# Patient Record
Sex: MALE | Race: WHITE | URBAN - METROPOLITAN AREA
[De-identification: names, ages, dates, MRNs, and addresses within clinical notes are randomized per-mention and may not be internally consistent; named-entity substitution may affect disease eponyms.]

---

## 2017-02-08 ENCOUNTER — HOSPITAL ENCOUNTER (EMERGENCY)
Facility: CLINIC | Age: 22
Discharge: HOME | End: 2017-02-09
Attending: EMERGENCY MEDICINE | Admitting: EMERGENCY MEDICINE

## 2017-02-08 DIAGNOSIS — T78.40XA ALLERGIC REACTION, INITIAL ENCOUNTER: ICD-10-CM

## 2017-02-08 PROCEDURE — 99283 EMERGENCY DEPT VISIT LOW MDM: CPT | Performed by: EMERGENCY MEDICINE

## 2017-02-08 PROCEDURE — 99284 EMERGENCY DEPT VISIT MOD MDM: CPT | Mod: Z6 | Performed by: EMERGENCY MEDICINE

## 2017-02-08 RX ORDER — FLUTICASONE PROPIONATE 50 MCG
1 SPRAY, SUSPENSION (ML) NASAL DAILY
COMMUNITY

## 2017-02-08 NOTE — ED AVS SNAPSHOT
[facility], [place], Emergency Department    [address]    [place] [address]    Phone:  [phone]    Fax:  [phone]                                       Yordan Anguiano   MRN: [id number]    Department:  [facility], Emergency Department   Date of Visit:  2/8/2017           After Visit Summary Signature Page     I have received my discharge instructions, and my questions have been answered. I have discussed any challenges I see with this plan with the nurse or doctor.    ..........................................................................................................................................  Patient/Patient Representative Signature      ..........................................................................................................................................  Patient Representative Print Name and Relationship to Patient    ..................................................               ................................................  Date                                            Time    ..........................................................................................................................................  Reviewed by Signature/Title    ...................................................              ..............................................  Date                                                            Time

## 2017-02-08 NOTE — ED AVS SNAPSHOT
" [facility], Emergency Department    [address]    [place] [address]    Phone:  [phone]    Fax:  [phone]                                       Yordan Anguiano   MRN: [id number]    Department:  [facility], Emergency Department   Date of Visit:  2/8/2017           Patient Information     Date Of Birth          1995        Your diagnoses for this visit were:     Allergic reaction, initial encounter        You were seen by Abelardo Sanchez MD.      Follow-up Information     Follow up with Darius Marino MD.    Specialty:  Family Practice    Contact information:    [facility] [place]  [address]  [place] [address]  [phone]          Discharge Instructions         Allergic Reaction,Generalized [Other]  You are having an allergic reaction. This may cause an itchy rash, dizziness, fainting, trouble breathing or swallowing, and swelling of the face or other parts of the body.  This can be caused by exposure to something in your surroundings that you have become sensitive to. This could be due to medicine or food. This could also be due to something you put on your skin or in your hair or something in the air. Often it is not possible to find out exactly what has caused your reaction.  The goal of today's treatment is to relieve symptoms. The rash will usually fade over several days, but can sometimes last up to two weeks.  Home Care:  1) If you know what you are allergic to, avoid it because future reactions could be worse than this one.  2) Avoid tight clothing and anything that heats up your skin (hot showers/baths, direct sunlight) since heat will make itching worse.  3) An ice pack will relieve local areas of intense itching and redness. Lanacaine cream or Solarcaine spray (or other product containing \"benzocaine\", available without a prescription) will reduce the itching.  4) Oral Benadryl (diphenhydramine) is an antihistamine available at drug and grocery stores. " Unless a prescription antihistamine was given, Benadryl may be used to reduce itching if large areas of the skin are involved. Use lower doses during the daytime and higher doses at bedtime since the drug may make you sleepy. [NOTE: Do not use Benadryl if you have glaucoma or if you are a man with trouble urinating due to an enlarged prostate.] Claritin (loratidine) is an antihistamine that causes less drowsiness and is a good alternative for daytime use.  Follow Up  Follow Up with your doctor or this facility in two days if your symptoms do not continue to improve. If you had a severe reaction today, or if you have had several mild-moderate allergic reactions in the past, ask your doctor about allergy testing to find out what you are allergic to. If your reaction included dizziness, fainting or trouble breathing or swallowing, ask your doctor about carrying an Allergy Kit (injectable epinephrine) for home use.  Get Prompt Medical Attention if any of the following occur:  -- Trouble breathing or swallowing  -- New or worse swelling in the face, eyelids, lips, mouth, tongue or throat  -- Dizziness, weakness or fainting    2041-7349 [employer]. [address] [place] [address]. All rights reserved. This information is not intended as a substitute for professional medical care. Always follow your healthcare professional's instructions.      Take prednisone as directed and an antihistamine as needed. Many antihistamines available including Benadryl (diphenhydramine) which can be sedating and Claritin (loratadine) which is not sedating.  Return if persistent symptoms, especially if swelling or trouble breathing.  Follow up also with your primary care provider.    24 Hour Appointment Hotline       To make an appointment at any [facility], call [phone] ([phone]). If you don't have a family doctor or clinic, we will help you find one. [facility] are conveniently located to  "serve the needs of you and your family.             Review of your medicines      START taking        Dose / Directions Last dose taken    predniSONE 20 MG tablet   Commonly known as:  DELTASONE   Quantity:  10 tablet        Take two tablets (= 40mg) each day for 5 (five) days   Refills:  0          Our records show that you are taking the medicines listed below. If these are incorrect, please call your family doctor or clinic.        Dose / Directions Last dose taken    fluticasone 50 MCG/ACT spray   Commonly known as:  FLONASE   Dose:  1 spray        Spray 1 spray into both nostrils daily   Refills:  0                Prescriptions were sent or printed at these locations (1 Prescription)                   Other Prescriptions                Printed at Department/Unit printer (1 of 1)         predniSONE (DELTASONE) 20 MG tablet                Orders Needing Specimen Collection     None      Pending Results     No orders found for last 2 day(s).            Pending Culture Results     No orders found for last 2 day(s).            Thank you for choosing [facility]       Thank you for choosing [facility] for your care. Our goal is always to provide you with excellent care. Hearing back from our patients is one way we can continue to improve our services. Please take a few minutes to complete the written survey that you may receive in the mail after you visit with us. Thank you!        [employer] Information     [employer] lets you send messages to your doctor, view your test results, renew your prescriptions, schedule appointments and more. To sign up, go to [email or web address] . Click on \"Log in\" on the left side of the screen, which will take you to the Welcome page. Then click on \"Sign up Now\" on the right side of the page.     You will be asked to enter the access code listed below, as well as some personal information. Please follow the directions to create your username and password.     Your access code is: " [email or web address]  Expires: 5/10/2017 12:33 AM     Your access code will  in 90 days. If you need help or a new code, please call your [facility] or [phone].        Care EveryWhere ID     This is your Care EveryWhere ID. This could be used by other organizations to access your [facility] medical records  [id number]        After Visit Summary       This is your record. Keep this with you and show to your community pharmacist(s) and doctor(s) at your next visit.

## 2017-02-09 VITALS
OXYGEN SATURATION: 99 % | DIASTOLIC BLOOD PRESSURE: 88 MMHG | SYSTOLIC BLOOD PRESSURE: 142 MMHG | RESPIRATION RATE: 18 BRPM | WEIGHT: 227 LBS | TEMPERATURE: 98.1 F

## 2017-02-09 PROCEDURE — 25000132 ZZH RX MED GY IP 250 OP 250 PS 637: Performed by: EMERGENCY MEDICINE

## 2017-02-09 PROCEDURE — 25000125 ZZHC RX 250: Performed by: EMERGENCY MEDICINE

## 2017-02-09 RX ORDER — PREDNISONE 20 MG/1
40 TABLET ORAL ONCE
Status: COMPLETED | OUTPATIENT
Start: 2017-02-09 | End: 2017-02-09

## 2017-02-09 RX ORDER — PREDNISONE 20 MG/1
TABLET ORAL
Qty: 10 TABLET | Refills: 0 | Status: SHIPPED | OUTPATIENT
Start: 2017-02-09

## 2017-02-09 RX ORDER — DIPHENHYDRAMINE HCL 50 MG
50 CAPSULE ORAL ONCE
Status: COMPLETED | OUTPATIENT
Start: 2017-02-09 | End: 2017-02-09

## 2017-02-09 RX ADMIN — DIPHENHYDRAMINE HYDROCHLORIDE 50 MG: 50 CAPSULE ORAL at 00:15

## 2017-02-09 RX ADMIN — PREDNISONE 40 MG: 20 TABLET ORAL at 00:15

## 2017-02-09 ASSESSMENT — ENCOUNTER SYMPTOMS
WEAKNESS: 0
CHILLS: 0
DIZZINESS: 0
BRUISES/BLEEDS EASILY: 0
SINUS PRESSURE: 0
FACIAL SWELLING: 1
NECK PAIN: 1
DIAPHORESIS: 0
MYALGIAS: 0
VOICE CHANGE: 0
EYE REDNESS: 0
ABDOMINAL PAIN: 0
CONSTITUTIONAL NEGATIVE: 1
NAUSEA: 0
TROUBLE SWALLOWING: 0
STRIDOR: 0
FATIGUE: 0
NECK STIFFNESS: 0
EYE ITCHING: 0
ARTHRALGIAS: 0
EYE PAIN: 0
APPETITE CHANGE: 0
CHEST TIGHTNESS: 0
VOMITING: 0
SORE THROAT: 0
RHINORRHEA: 0
PALPITATIONS: 0
FEVER: 0
JOINT SWELLING: 0
LIGHT-HEADEDNESS: 0
CHOKING: 1
HEADACHES: 0
WHEEZING: 0
SHORTNESS OF BREATH: 1
COUGH: 0

## 2017-02-09 NOTE — ED PROVIDER NOTES
History     Chief Complaint   Patient presents with     Allergic Reaction     Stated has had some sob today.  Just finished a soccer game and having more problems breathing.  Stated some swelling to the left eye.  No hx of allergies in past.     GILLES Anguiano is a 21 year old otherwise healthy male who presents to the emergency department today for evaluation of a possible allergic reaction. Patient states he was playing soccer tonight and on the way home he suddenly developed swelling sensation in his throat, shortness of breath and swelling of his left eyelid. He denies any known exposures or known allergies. He denies a rash or any itching. No swelling of his lips or tongue. Here in the ED, patient states his breathing has significantly improved. He states his throat still feels mildly tight but he denies any difficulty swallowing. He denies any new medications. He states his only medication is Flonase and he has taken it for years. No other known exposure to potential allergen or irritant. No dyspnea now. No wheezing. No rashes or pruritus. No fever or chills. Mild nausea. No itchy eyes or tearing or redness.     I have reviewed the Medications, Allergies, Past Medical and Surgical History, and Social History in the Epic system.    History reviewed. No pertinent past medical history.    History reviewed. No pertinent past surgical history.    History reviewed. No pertinent family history.    Social History   Substance Use Topics     Smoking status: Never Smoker      Smokeless tobacco: Not on file     Alcohol Use: Yes      Comment: occasional     No current facility-administered medications for this encounter.     Current Outpatient Prescriptions   Medication     predniSONE (DELTASONE) 20 MG tablet     fluticasone (FLONASE) 50 MCG/ACT spray      No Known Allergies    Review of Systems   Constitutional: Negative.  Negative for fever, chills, diaphoresis, appetite change and fatigue.   HENT: Positive for  facial swelling. Negative for congestion, mouth sores, postnasal drip, rhinorrhea, sinus pressure, sneezing, sore throat, trouble swallowing and voice change.    Eyes: Negative for pain, redness, itching and visual disturbance.   Respiratory: Positive for choking (throat tightness) and shortness of breath. Negative for cough, chest tightness, wheezing and stridor.    Cardiovascular: Negative for chest pain, palpitations and leg swelling.   Gastrointestinal: Negative for nausea, vomiting and abdominal pain.   Musculoskeletal: Positive for neck pain. Negative for myalgias, joint swelling, arthralgias and neck stiffness.   Skin: Negative for rash.   Allergic/Immunologic: Negative for immunocompromised state.   Neurological: Negative for dizziness, syncope, weakness, light-headedness and headaches.   Hematological: Does not bruise/bleed easily.   All other systems reviewed and are negative.      Physical Exam   BP: (!) 140/100 mmHg  Heart Rate: 100  Temp: 98.1  F (36.7  C)  Resp: 18  Weight: 102.967 kg (227 lb)  SpO2: 99 %  Physical Exam   Constitutional: He appears well-developed and well-nourished. No distress.   HENT:   Head: Normocephalic and atraumatic.   Nose: Nose normal.   Mouth/Throat: Uvula is midline, oropharynx is clear and moist and mucous membranes are normal. No oral lesions. No uvula swelling.   Eyes: Conjunctivae and EOM are normal. Pupils are equal, round, and reactive to light.   No chemosis   Neck: Normal range of motion. Neck supple.   Cardiovascular: Normal rate, regular rhythm, normal heart sounds and intact distal pulses.    Pulmonary/Chest: Effort normal and breath sounds normal. No respiratory distress. He has no wheezes.   Abdominal: Soft. There is no tenderness.   Musculoskeletal: He exhibits no edema or tenderness.   Neurological: He is alert.   Skin: Skin is warm and dry. No rash noted. No erythema.   Psychiatric: He has a normal mood and affect. His behavior is normal.   Nursing note and  vitals reviewed.      ED Course     Procedures   12:07 AM  The patient was seen and examined by Dr. Sanchez in Room ED02.              Critical Care time:  none               Labs Ordered and Resulted from Time of ED Arrival Up to the Time of Departure from the ED - No data to display    Assessments & Plan (with Medical Decision Making)   Most likely allergic reaction but no precipitant is clear. Periorbital swelling is not present now. No evidence of anaphylaxis. With history of swelling will treat with prednisone as well as an antihistamine. Return if persistent symptoms. Clinic follow up.    I have reviewed the nursing notes.    I have reviewed the findings, diagnosis, plan and need for follow up with the patient.    New Prescriptions    PREDNISONE (DELTASONE) 20 MG TABLET    Take two tablets (= 40mg) each day for 5 (five) days       Final diagnoses:   Allergic reaction, initial encounter   I, Natividad Boggs, am serving as a trained medical scribe to document services personally performed by Abelardo Sanchez MD, based on the provider's statements to me.      IAbelardo MD, was physically present and have reviewed and verified the accuracy of this note documented by Natividad Boggs.       2/8/2017   [facility], EMERGENCY DEPARTMENT      Abelardo Sanchez MD  02/09/17 0039

## 2017-02-09 NOTE — DISCHARGE INSTRUCTIONS
"  Allergic Reaction,Generalized [Other]  You are having an allergic reaction. This may cause an itchy rash, dizziness, fainting, trouble breathing or swallowing, and swelling of the face or other parts of the body.  This can be caused by exposure to something in your surroundings that you have become sensitive to. This could be due to medicine or food. This could also be due to something you put on your skin or in your hair or something in the air. Often it is not possible to find out exactly what has caused your reaction.  The goal of today's treatment is to relieve symptoms. The rash will usually fade over several days, but can sometimes last up to two weeks.  Home Care:  1) If you know what you are allergic to, avoid it because future reactions could be worse than this one.  2) Avoid tight clothing and anything that heats up your skin (hot showers/baths, direct sunlight) since heat will make itching worse.  3) An ice pack will relieve local areas of intense itching and redness. Lanacaine cream or Solarcaine spray (or other product containing \"benzocaine\", available without a prescription) will reduce the itching.  4) Oral Benadryl (diphenhydramine) is an antihistamine available at drug and grocery stores. Unless a prescription antihistamine was given, Benadryl may be used to reduce itching if large areas of the skin are involved. Use lower doses during the daytime and higher doses at bedtime since the drug may make you sleepy. [NOTE: Do not use Benadryl if you have glaucoma or if you are a man with trouble urinating due to an enlarged prostate.] Claritin (loratidine) is an antihistamine that causes less drowsiness and is a good alternative for daytime use.  Follow Up  Follow Up with your doctor or this facility in two days if your symptoms do not continue to improve. If you had a severe reaction today, or if you have had several mild-moderate allergic reactions in the past, ask your doctor about allergy testing " to find out what you are allergic to. If your reaction included dizziness, fainting or trouble breathing or swallowing, ask your doctor about carrying an Allergy Kit (injectable epinephrine) for home use.  Get Prompt Medical Attention if any of the following occur:  -- Trouble breathing or swallowing  -- New or worse swelling in the face, eyelids, lips, mouth, tongue or throat  -- Dizziness, weakness or fainting    0166-1697 [employer]. [address] [place] [address], [place] [address]. All rights reserved. This information is not intended as a substitute for professional medical care. Always follow your healthcare professional's instructions.      Take prednisone as directed and an antihistamine as needed. Many antihistamines available including Benadryl (diphenhydramine) which can be sedating and Claritin (loratadine) which is not sedating.  Return if persistent symptoms, especially if swelling or trouble breathing.  Follow up also with your primary care provider.